# Patient Record
Sex: MALE | Race: WHITE | NOT HISPANIC OR LATINO | URBAN - METROPOLITAN AREA
[De-identification: names, ages, dates, MRNs, and addresses within clinical notes are randomized per-mention and may not be internally consistent; named-entity substitution may affect disease eponyms.]

---

## 2018-06-25 ENCOUNTER — APPOINTMENT (OUTPATIENT)
Dept: RADIOLOGY | Facility: MEDICAL CENTER | Age: 43
End: 2018-06-25
Attending: EMERGENCY MEDICINE
Payer: MEDICAID

## 2018-06-25 ENCOUNTER — HOSPITAL ENCOUNTER (EMERGENCY)
Facility: MEDICAL CENTER | Age: 43
End: 2018-06-25
Attending: EMERGENCY MEDICINE
Payer: MEDICAID

## 2018-06-25 VITALS
OXYGEN SATURATION: 97 % | DIASTOLIC BLOOD PRESSURE: 79 MMHG | WEIGHT: 256.62 LBS | SYSTOLIC BLOOD PRESSURE: 120 MMHG | BODY MASS INDEX: 32.93 KG/M2 | HEART RATE: 91 BPM | HEIGHT: 74 IN | TEMPERATURE: 97.1 F | RESPIRATION RATE: 18 BRPM

## 2018-06-25 DIAGNOSIS — S50.12XA CONTUSION OF LEFT FOREARM, INITIAL ENCOUNTER: ICD-10-CM

## 2018-06-25 PROCEDURE — 700102 HCHG RX REV CODE 250 W/ 637 OVERRIDE(OP): Performed by: EMERGENCY MEDICINE

## 2018-06-25 PROCEDURE — 73090 X-RAY EXAM OF FOREARM: CPT | Mod: LT

## 2018-06-25 PROCEDURE — 99283 EMERGENCY DEPT VISIT LOW MDM: CPT

## 2018-06-25 PROCEDURE — A9270 NON-COVERED ITEM OR SERVICE: HCPCS | Performed by: EMERGENCY MEDICINE

## 2018-06-25 RX ORDER — OXYCODONE AND ACETAMINOPHEN 7.5; 325 MG/1; MG/1
1 TABLET ORAL ONCE
Status: COMPLETED | OUTPATIENT
Start: 2018-06-25 | End: 2018-06-25

## 2018-06-25 RX ORDER — FLUTICASONE PROPIONATE 50 MCG
1 SPRAY, SUSPENSION (ML) NASAL DAILY
COMMUNITY

## 2018-06-25 RX ORDER — CETIRIZINE HYDROCHLORIDE 10 MG/1
10 TABLET ORAL DAILY
COMMUNITY

## 2018-06-25 RX ADMIN — OXYCODONE HYDROCHLORIDE AND ACETAMINOPHEN 1 TABLET: 7.5; 325 TABLET ORAL at 01:17

## 2018-06-25 ASSESSMENT — LIFESTYLE VARIABLES: DO YOU DRINK ALCOHOL: NO

## 2018-06-25 ASSESSMENT — PAIN SCALES - GENERAL
PAINLEVEL_OUTOF10: 5
PAINLEVEL_OUTOF10: 6
PAINLEVEL_OUTOF10: 8

## 2018-06-25 NOTE — ED PROVIDER NOTES
ED Provider Note    ED Provider Note      Primary care provider: Pcp Pt States None    CHIEF COMPLAINT  Chief Complaint   Patient presents with   • T-5000 GLF     fell out of RV down 2 steps onto ground, - LOC, denied hitting head, fell directly onto L forearm   • Arm Pain     LEFT FOREARM, palpable deformity noted, rates pain 6/10       HPI  Leandro Pérez is a 43 y.o. male who presents to the Emergency Department with chief complaint of left forearm pain.  Patient stated he stumbled when getting out of his RV this afternoon and collided with a door brace with the mid left forearm over the ulnar area.  Patient reports feeling an abnormality in the area and having extreme pain in the area.  The pain is worse with any movement or palpation the area states that the pain seems to shoot up towards his elbow but denies pain in the elbow denies pain in his hand.  Patient had very slight numbness and tingling in the left fourth and fifth digit as well as the dorsum of the first digit initially but that is resolving at this time.  He denies any head injury chest abdominal pelvic pain pain in other extremities or other recent illness.    REVIEW OF SYSTEMS  Positive for pain and palpable abnormality left forearm negative for any other extremity pain head injury chest abdomen pelvic pain or recent illness    PAST MEDICAL HISTORY   has a past medical history of ASTHMA and Seasonal allergies.    SURGICAL HISTORY   has a past surgical history that includes other orthopedic surgery and other.    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 (Registered Nurse) on  "06/25/18 at 0048  Med List Status: Complete   Medication Last Dose Status   cetirizine (ZYRTEC) 10 MG Tab 6/24/2018 Active   fluticasone (FLONASE) 50 MCG/ACT nasal spray 6/24/2018 Active   NS SOLN 60 mL with albuterol 2.5 mg/0.5 mL NEBU 5 mL PRN Active                ALLERGIES  Allergies   Allergen Reactions   • Codeine    • Lortab    • Vicodin [Hydrocodone-Acetaminophen]        PHYSICAL EXAM  VITAL SIGNS: /79   Pulse 91   Temp 36.2 °C (97.1 °F)   Resp 18   Ht 1.88 m (6' 2\")   Wt 116.4 kg (256 lb 9.9 oz)   SpO2 97%   BMI 32.95 kg/m²   Pulse ox interpretation: I interpret this pulse ox as normal.  Constitutional: Alert and oriented x 3, no acute distress  HEENT: Atraumatic normocephalic, pupils are equal round reactive to light extraocular movements are intact. The nares is clear, external ears are normal, mouth shows moist mucous membranes  Neck: Supple, no JVD no tracheal deviation  Cardiovascular: Regular rate and rhythm no murmur rub or gallop 2+ pulses peripherally x4  Thorax & Lungs: No respiratory distress, no wheezes rales or rhonchi, No chest tenderness.   GI: Soft nontender nondistended positive bowel sounds, no peritoneal signs  Skin: Warm dry no acute rash or lesion  Musculoskeletal: Right upper and bilateral lower extremities unremarkable for acute abnormality.  In the left upper extremity normal range and strength at the shoulder and elbow.  Patient has tenderness to palpation along the midshaft ulnar area with slight area of ecchymosis and tenderness at the midshaft.  Normal flexion-extension of the wrist normal  strength normal cap refill and sensation radially and ulnarly in the left hand.  Neurologic: Cranial nerves III through XII are grossly intact, no sensory deficit, no cerebellar dysfunction   Psychiatric: Appropriate affect for situation at this time      DIAGNOSTIC STUDIES / PROCEDURES      RADIOLOGY  DX-FOREARM LEFT   Final Result         1.  No acute traumatic bony injury. " "       The radiologist's interpretation of all radiological studies have been reviewed by me.    COURSE & MEDICAL DECISION MAKING  Pertinent Labs & Imaging studies reviewed. (See chart for details)    1:06 AM - Patient seen and examined at bedside.               Medical Decision Making: X-rays unremarkable.  Patient feeling better after 1 dose of Percocet.  Injury not necessitating outpatient narcotics or prescription medication given instruction Tylenol ibuprofen symptomatic treatment return for worsening pain numbness tingling weakness any other acute symptoms or concerns otherwise follow-up with primary care as necessary discharged home in stable condition.    /79   Pulse 91   Temp 36.2 °C (97.1 °F)   Resp 18   Ht 1.88 m (6' 2\")   Wt 116.4 kg (256 lb 9.9 oz)   SpO2 97%   BMI 32.95 kg/m²     [facility], Emergency Dept  [address] [place] [id number]  [phone]    If symptoms worsen    Pcp Pt States None      As needed      Discharge Medication List as of 6/25/2018  2:45 AM          FINAL IMPRESSION  1. Contusion of left forearm, initial encounter          This dictation has been created using voice recognition software and/or scribes. The accuracy of the dictation is limited by the abilities of the software and the expertise of the scribes. I expect there may be some errors of grammar and possibly content. I made every attempt to manually correct the errors within my dictation. However, errors related to voice recognition software and/or scribes may still exist and should be interpreted within the appropriate context.            "

## 2018-06-25 NOTE — ED TRIAGE NOTES
"Leandro Pérez  43 y.o.  Chief Complaint   Patient presents with   • T-5000 GLF     fell out of RV down 2 steps onto ground, - LOC, denied hitting head, fell directly onto L forearm   • Arm Pain     LEFT FOREARM, palpable deformity noted, rates pain 6/10       Ambulatory to triage with steady gait for above. Complains of tingling to fingers. Limited ROM to L wrist/hand.    States \"Pain was a 10/10 but I got pretty stoned so now it's down to a 6. I got stoned because it hurt so goddamn bad.\"    Patient roomed immediately to [place] [id number] - ambulatory with steady gait from triage. Report given at bedside to ROSCOE Tamayo.  "

## 2018-06-25 NOTE — ED NOTES
Dr. Dumas at bedside educated pt on poc and discharge  Pt ambulatory with steady gait, pt verbalized understanding of poc and discharge , no questions ,  VSS and pt in nad at this time

## 2018-06-25 NOTE — DISCHARGE INSTRUCTIONS
Bone Bruise   A bone bruise is a small hidden fracture of the bone. It typically occurs with bones located close to the surface of the skin.   SYMPTOMS  · The pain lasts longer than a normal bruise.  · The bruised area is difficult to use.  · There may be discoloration or swelling of the bruised area.  · When a bone bruise is found with injury to the anterior cruciate ligament (in the knee) there is often an increased:  · Amount of fluid in the knee  · Time the fluid in the knee lasts.  · Number of days until you are walking normally and regaining the motion you had before the injury.  · Number of days with pain from the injury.  DIAGNOSIS   It can only be seen on X-rays known as MRIs. This stands for magnetic resonance imaging. A regular X-ray taken of a bone bruise would appear to be normal. A bone bruise is a common injury in the knee and the heel bone (calcaneus). The problems are similar to those produced by stress fractures, which are bone injuries caused by overuse. A bone bruise may also be a sign of other injuries. For example, bone bruises are commonly found where an anterior cruciate ligament (ACL) in the knee has been pulled away from the bone (ruptured). A ligament is a tough fibrous material that connects bones together to make our joints stable. Bruises of the bone last a lot longer than bruises of the muscle or tissues beneath the skin. Bone bruises can last from days to months and are often more severe and painful than other bruises.  TREATMENT  Because bone bruises are sudden injuries you cannot often prevent them, other than by being extremely careful. Some things you can do to improve the condition are:  · Apply ice to the sore area for 15-20 minutes, 3-4 times per day while awake for the first 2 days. Put the ice in a plastic bag, and place a towel between the bag of ice and your skin.  · Keep your bruised area raised (elevated) when possible to lessen swelling.  · For activity:  · Use crutches  when necessary; do not put weight on the injured leg until you are no longer tender.  · You may walk on your affected part as the pain allows, or as instructed.  · Start weight bearing gradually on the bruised part.  · Continue to use crutches or a cane until you can stand without causing pain, or as instructed.  · If a plaster splint was applied, wear the splint until you are seen for a follow-up examination. Rest it on nothing harder than a pillow the first 24 hours. Do not put weight on it. Do not get it wet. You may take it off to take a shower or bath.  · If an air splint was applied, more air may be blown into or out of the splint as needed for comfort. You may take it off at night and to take a shower or bath.  · Wiggle your toes in the splint several times per day if you are able.  · You may have been given an elastic bandage to use with the plaster splint or alone. The splint is too tight if you have numbness, tingling or if your foot becomes cold and blue. Adjust the bandage to make it comfortable.  · Only take over-the-counter or prescription medicines for pain, discomfort, or fever as directed by your caregiver.  · Follow all instructions for follow up with your caregiver. This includes any orthopedic referrals, physical therapy, and rehabilitation. Any delay in obtaining necessary care could result in a delay or failure of the bones to heal.  SEEK MEDICAL CARE IF:   · You have an increase in bruising, swelling, or pain.  · You notice coldness of your toes.  · You do not get pain relief with medications.  SEEK IMMEDIATE MEDICAL CARE IF:   · Your toes are numb or blue.  · You have severe pain not controlled with medications.  · If any of the problems that caused you to seek care are becoming worse.  Document Released: 03/09/2005 Document Revised: 03/11/2013 Document Reviewed: 07/22/2009  [employer]® Patient Information ©2014 [employer].